# Patient Record
Sex: FEMALE | Race: WHITE | NOT HISPANIC OR LATINO | Employment: STUDENT | URBAN - METROPOLITAN AREA
[De-identification: names, ages, dates, MRNs, and addresses within clinical notes are randomized per-mention and may not be internally consistent; named-entity substitution may affect disease eponyms.]

---
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
## 2023-01-20 ENCOUNTER — OFFICE VISIT (OUTPATIENT)
Dept: FAMILY MEDICINE | Facility: CLINIC | Age: 18
End: 2023-01-20
Payer: COMMERCIAL

## 2023-01-20 VITALS
OXYGEN SATURATION: 98 % | WEIGHT: 231.81 LBS | DIASTOLIC BLOOD PRESSURE: 86 MMHG | TEMPERATURE: 98 F | HEIGHT: 64 IN | HEART RATE: 92 BPM | BODY MASS INDEX: 39.58 KG/M2 | SYSTOLIC BLOOD PRESSURE: 110 MMHG

## 2023-01-20 DIAGNOSIS — Z00.00 ENCOUNTER FOR MEDICAL EXAMINATION TO ESTABLISH CARE: Primary | ICD-10-CM

## 2023-01-20 DIAGNOSIS — F41.9 ANXIETY AND DEPRESSION: ICD-10-CM

## 2023-01-20 DIAGNOSIS — F32.A ANXIETY AND DEPRESSION: ICD-10-CM

## 2023-01-20 DIAGNOSIS — Z72.0 TOBACCO USE: ICD-10-CM

## 2023-01-20 DIAGNOSIS — Z00.00 ANNUAL PHYSICAL EXAM: ICD-10-CM

## 2023-01-20 DIAGNOSIS — E66.09 CLASS 2 OBESITY DUE TO EXCESS CALORIES WITHOUT SERIOUS COMORBIDITY WITH BODY MASS INDEX (BMI) OF 39.0 TO 39.9 IN ADULT: ICD-10-CM

## 2023-01-20 PROCEDURE — 3079F PR MOST RECENT DIASTOLIC BLOOD PRESSURE 80-89 MM HG: ICD-10-PCS | Mod: CPTII,S$GLB,, | Performed by: FAMILY MEDICINE

## 2023-01-20 PROCEDURE — 1160F PR REVIEW ALL MEDS BY PRESCRIBER/CLIN PHARMACIST DOCUMENTED: ICD-10-PCS | Mod: CPTII,S$GLB,, | Performed by: FAMILY MEDICINE

## 2023-01-20 PROCEDURE — 1160F RVW MEDS BY RX/DR IN RCRD: CPT | Mod: CPTII,S$GLB,, | Performed by: FAMILY MEDICINE

## 2023-01-20 PROCEDURE — 3074F PR MOST RECENT SYSTOLIC BLOOD PRESSURE < 130 MM HG: ICD-10-PCS | Mod: CPTII,S$GLB,, | Performed by: FAMILY MEDICINE

## 2023-01-20 PROCEDURE — 1159F PR MEDICATION LIST DOCUMENTED IN MEDICAL RECORD: ICD-10-PCS | Mod: CPTII,S$GLB,, | Performed by: FAMILY MEDICINE

## 2023-01-20 PROCEDURE — 3008F PR BODY MASS INDEX (BMI) DOCUMENTED: ICD-10-PCS | Mod: CPTII,S$GLB,, | Performed by: FAMILY MEDICINE

## 2023-01-20 PROCEDURE — 99999 PR PBB SHADOW E&M-EST. PATIENT-LVL IV: ICD-10-PCS | Mod: PBBFAC,,, | Performed by: FAMILY MEDICINE

## 2023-01-20 PROCEDURE — 3079F DIAST BP 80-89 MM HG: CPT | Mod: CPTII,S$GLB,, | Performed by: FAMILY MEDICINE

## 2023-01-20 PROCEDURE — 99999 PR PBB SHADOW E&M-EST. PATIENT-LVL IV: CPT | Mod: PBBFAC,,, | Performed by: FAMILY MEDICINE

## 2023-01-20 PROCEDURE — 3008F BODY MASS INDEX DOCD: CPT | Mod: CPTII,S$GLB,, | Performed by: FAMILY MEDICINE

## 2023-01-20 PROCEDURE — 99214 OFFICE O/P EST MOD 30 MIN: CPT | Mod: S$GLB,,, | Performed by: FAMILY MEDICINE

## 2023-01-20 PROCEDURE — 3074F SYST BP LT 130 MM HG: CPT | Mod: CPTII,S$GLB,, | Performed by: FAMILY MEDICINE

## 2023-01-20 PROCEDURE — 99214 PR OFFICE/OUTPT VISIT, EST, LEVL IV, 30-39 MIN: ICD-10-PCS | Mod: S$GLB,,, | Performed by: FAMILY MEDICINE

## 2023-01-20 PROCEDURE — 1159F MED LIST DOCD IN RCRD: CPT | Mod: CPTII,S$GLB,, | Performed by: FAMILY MEDICINE

## 2023-01-20 RX ORDER — MEDROXYPROGESTERONE ACETATE 150 MG/ML
150 INJECTION, SUSPENSION INTRAMUSCULAR
COMMUNITY
Start: 2022-12-02

## 2023-01-20 NOTE — PATIENT INSTRUCTIONS
The [facility] requires the patients the call and make their own appointments.  Please see the below phone numbers:    Adults - [phone]  Pediatrics - [phone]    [facility] and [facility] are also great resources for therapy

## 2023-01-20 NOTE — PROGRESS NOTES
Assessment & Plan:    Encounter for medical examination to establish care    Annual physical exam    Anxiety and depression  Patient was amade aware of the availability of counseling through the [facility] department, [email or web address], or [facility].    Class 2 obesity due to excess calories without serious comorbidity with body mass index (BMI) of 39.0 to 39.9 in adult  Encouraged moderate exercise 30 minutes a day, 5 days a week.    Tobacco use  Admits to vaping occasionally - encouraged complete smoking cessation.         Health maintenance reviewed:  -Declines STI screening and vaccines     Follow-up: Follow up in 1 year (on 1/20/2024).  ______________________________________________________________________    Chief Complaint  Chief Complaint   Patient presents with    Establish Care       HPI  Skylar Velasquez is a 17 y.o. female with medical diagnoses as listed in the medical history and problem list that presents to the office to establish care. She states that she has been dealing with depression and anxiety and would like to see a therapist. She is otherwise doing well.     Health Maintenance         Date Due Completion Date    Hepatitis C Screening Never done ---    Lipid Panel Never done ---    COVID-19 Vaccine (1) Never done ---    HIV Screening Never done ---    Chlamydia Screening Never done ---    Influenza Vaccine (1) 09/01/2022 11/17/2020    TETANUS VACCINE 04/22/2031 4/22/2021              PAST MEDICAL HISTORY:  History reviewed. No pertinent past medical history.    PAST SURGICAL HISTORY:  History reviewed. No pertinent surgical history.    SOCIAL HISTORY:  Social History     Socioeconomic History    Marital status: Single   Tobacco Use    Smoking status: Never    Smokeless tobacco: Never   Substance and Sexual Activity    Alcohol use: No    Drug use: No    Sexual activity: Never       FAMILY HISTORY:  Family History   Problem Relation Age of Onset    No Known Problems  "Mother     Diabetes Father     Bipolar disorder Sister     No Known Problems Sister        ALLERGIES AND MEDICATIONS: updated and reviewed.  Review of patient's allergies indicates:   Allergen Reactions    Augmentin [amoxicillin-pot clavulanate] Rash     Current Outpatient Medications   Medication Sig Dispense Refill    medroxyPROGESTERone (DEPO-PROVERA) 150 mg/mL injection Inject 150 mg into the muscle every 3 (three) months.       No current facility-administered medications for this visit.         ROS  Review of Systems   Constitutional:  Negative for activity change, fever and unexpected weight change.   HENT:  Negative for congestion and sore throat.    Eyes:  Negative for photophobia and visual disturbance.   Respiratory:  Negative for cough and shortness of breath.    Cardiovascular:  Negative for chest pain and leg swelling.   Gastrointestinal:  Negative for abdominal pain, constipation, diarrhea, nausea and vomiting.   Endocrine: Negative for polydipsia, polyphagia and polyuria.   Genitourinary:  Negative for dysuria and urgency.   Musculoskeletal:  Negative for arthralgias and gait problem.   Skin:  Negative for color change.   Neurological:  Negative for dizziness, weakness and headaches.   Psychiatric/Behavioral:  Positive for dysphoric mood. Negative for sleep disturbance. The patient is nervous/anxious.          Physical Exam  Vitals:    01/20/23 1104   BP: 110/86   BP Location: Right arm   Patient Position: Sitting   BP Method: Medium (Manual)   Pulse: 92   Temp: 98.1 °F (36.7 °C)   TempSrc: Oral   SpO2: 98%   Weight: 105.2 kg (231 lb 13 oz)   Height: 5' 4" (1.626 m)    Body mass index is 39.79 kg/m².  Weight: 105.2 kg (231 lb 13 oz)   Height: 5' 4" (162.6 cm)   Physical Exam  Constitutional:       General: She is not in acute distress.     Appearance: She is obese.   HENT:      Head: Normocephalic and atraumatic.   Neck:      Thyroid: No thyromegaly.   Cardiovascular:      Rate and Rhythm: Normal " rate and regular rhythm.      Pulses: Normal pulses.      Heart sounds: Normal heart sounds.   Pulmonary:      Effort: Pulmonary effort is normal. No respiratory distress.      Breath sounds: Normal breath sounds.   Musculoskeletal:      Cervical back: Neck supple.      Right lower leg: No edema.      Left lower leg: No edema.   Lymphadenopathy:      Cervical: No cervical adenopathy.   Skin:     General: Skin is warm and dry.      Findings: No rash.   Neurological:      General: No focal deficit present.      Mental Status: She is alert and oriented to person, place, and time.   Psychiatric:         Mood and Affect: Mood normal.         Behavior: Behavior normal.         Thought Content: Thought content normal.

## 2023-01-25 DIAGNOSIS — Z11.59 NEED FOR HEPATITIS C SCREENING TEST: ICD-10-CM

## 2023-07-26 ENCOUNTER — PATIENT OUTREACH (OUTPATIENT)
Dept: ADMINISTRATIVE | Facility: HOSPITAL | Age: 18
End: 2023-07-26

## 2023-07-26 RX ORDER — IBUPROFEN 600 MG/1
TABLET ORAL
COMMUNITY
Start: 2023-05-08

## 2023-07-26 NOTE — PROGRESS NOTES
[facility] 1 Comm. Gap Report PCP Visit Needed 07.18.23 - office visit completed on 01/20/2023.    Labs updated.

## 2024-12-26 ENCOUNTER — PATIENT MESSAGE (OUTPATIENT)
Dept: FAMILY MEDICINE | Facility: CLINIC | Age: 19
End: 2024-12-26

## 2024-12-26 ENCOUNTER — LAB VISIT (OUTPATIENT)
Dept: LAB | Facility: HOSPITAL | Age: 19
End: 2024-12-26
Payer: COMMERCIAL

## 2024-12-26 ENCOUNTER — OFFICE VISIT (OUTPATIENT)
Dept: FAMILY MEDICINE | Facility: CLINIC | Age: 19
End: 2024-12-26
Payer: COMMERCIAL

## 2024-12-26 VITALS
DIASTOLIC BLOOD PRESSURE: 82 MMHG | HEART RATE: 81 BPM | SYSTOLIC BLOOD PRESSURE: 112 MMHG | WEIGHT: 217.94 LBS | OXYGEN SATURATION: 95 % | HEIGHT: 64 IN | TEMPERATURE: 98 F | RESPIRATION RATE: 18 BRPM | BODY MASS INDEX: 37.21 KG/M2

## 2024-12-26 DIAGNOSIS — F41.9 ANXIETY AND DEPRESSION: ICD-10-CM

## 2024-12-26 DIAGNOSIS — Z87.891 HISTORY OF NICOTINE VAPING: ICD-10-CM

## 2024-12-26 DIAGNOSIS — Z86.59 HISTORY OF PANIC ATTACKS: ICD-10-CM

## 2024-12-26 DIAGNOSIS — F32.A ANXIETY AND DEPRESSION: ICD-10-CM

## 2024-12-26 DIAGNOSIS — F51.04 PSYCHOPHYSIOLOGICAL INSOMNIA: ICD-10-CM

## 2024-12-26 DIAGNOSIS — E66.09 CLASS 2 OBESITY DUE TO EXCESS CALORIES WITHOUT SERIOUS COMORBIDITY WITH BODY MASS INDEX (BMI) OF 37.0 TO 37.9 IN ADULT: ICD-10-CM

## 2024-12-26 DIAGNOSIS — Z00.00 ENCOUNTER FOR ANNUAL HEALTH EXAMINATION: ICD-10-CM

## 2024-12-26 DIAGNOSIS — Z87.09 HISTORY OF TONSILLITIS: ICD-10-CM

## 2024-12-26 DIAGNOSIS — E66.812 CLASS 2 OBESITY DUE TO EXCESS CALORIES WITHOUT SERIOUS COMORBIDITY WITH BODY MASS INDEX (BMI) OF 37.0 TO 37.9 IN ADULT: ICD-10-CM

## 2024-12-26 DIAGNOSIS — Z00.00 ENCOUNTER FOR ANNUAL HEALTH EXAMINATION: Primary | ICD-10-CM

## 2024-12-26 DIAGNOSIS — N92.1 MENORRHAGIA WITH IRREGULAR CYCLE: ICD-10-CM

## 2024-12-26 PROBLEM — N92.0 MENORRHAGIA: Status: ACTIVE | Noted: 2024-12-26

## 2024-12-26 LAB
ALBUMIN SERPL BCP-MCNC: 3.1 G/DL (ref 3.5–5.2)
ALP SERPL-CCNC: 66 U/L (ref 40–150)
ALT SERPL W/O P-5'-P-CCNC: 8 U/L (ref 10–44)
ANION GAP SERPL CALC-SCNC: 10 MMOL/L (ref 8–16)
AST SERPL-CCNC: 12 U/L (ref 10–40)
BASOPHILS # BLD AUTO: 0.03 K/UL (ref 0–0.2)
BASOPHILS NFR BLD: 0.4 % (ref 0–1.9)
BILIRUB SERPL-MCNC: 0.4 MG/DL (ref 0.1–1)
BUN SERPL-MCNC: 8 MG/DL (ref 6–20)
CALCIUM SERPL-MCNC: 8.8 MG/DL (ref 8.7–10.5)
CHLORIDE SERPL-SCNC: 106 MMOL/L (ref 95–110)
CHOLEST SERPL-MCNC: 162 MG/DL (ref 120–199)
CHOLEST/HDLC SERPL: 3.7 {RATIO} (ref 2–5)
CO2 SERPL-SCNC: 25 MMOL/L (ref 23–29)
CREAT SERPL-MCNC: 0.7 MG/DL (ref 0.5–1.4)
DIFFERENTIAL METHOD BLD: ABNORMAL
EOSINOPHIL # BLD AUTO: 0.2 K/UL (ref 0–0.5)
EOSINOPHIL NFR BLD: 2.8 % (ref 0–8)
ERYTHROCYTE [DISTWIDTH] IN BLOOD BY AUTOMATED COUNT: 13.2 % (ref 11.5–14.5)
EST. GFR  (NO RACE VARIABLE): >60 ML/MIN/1.73 M^2
ESTIMATED AVG GLUCOSE: 100 MG/DL (ref 68–131)
GLUCOSE SERPL-MCNC: 89 MG/DL (ref 70–110)
HBA1C MFR BLD: 5.1 % (ref 4–5.6)
HCT VFR BLD AUTO: 44.8 % (ref 37–48.5)
HDLC SERPL-MCNC: 44 MG/DL (ref 40–75)
HDLC SERPL: 27.2 % (ref 20–50)
HGB BLD-MCNC: 14.3 G/DL (ref 12–16)
IMM GRANULOCYTES # BLD AUTO: 0.03 K/UL (ref 0–0.04)
IMM GRANULOCYTES NFR BLD AUTO: 0.4 % (ref 0–0.5)
LDLC SERPL CALC-MCNC: 100.2 MG/DL (ref 63–159)
LYMPHOCYTES # BLD AUTO: 2.3 K/UL (ref 1–4.8)
LYMPHOCYTES NFR BLD: 33.9 % (ref 18–48)
MCH RBC QN AUTO: 28.7 PG (ref 27–31)
MCHC RBC AUTO-ENTMCNC: 31.9 G/DL (ref 32–36)
MCV RBC AUTO: 90 FL (ref 82–98)
MONOCYTES # BLD AUTO: 0.5 K/UL (ref 0.3–1)
MONOCYTES NFR BLD: 6.8 % (ref 4–15)
NEUTROPHILS # BLD AUTO: 3.7 K/UL (ref 1.8–7.7)
NEUTROPHILS NFR BLD: 55.7 % (ref 38–73)
NONHDLC SERPL-MCNC: 118 MG/DL
NRBC BLD-RTO: 0 /100 WBC
PLATELET # BLD AUTO: 352 K/UL (ref 150–450)
PMV BLD AUTO: 11.3 FL (ref 9.2–12.9)
POTASSIUM SERPL-SCNC: 4.3 MMOL/L (ref 3.5–5.1)
PROT SERPL-MCNC: 6.6 G/DL (ref 6–8.4)
RBC # BLD AUTO: 4.99 M/UL (ref 4–5.4)
SODIUM SERPL-SCNC: 141 MMOL/L (ref 136–145)
TRIGL SERPL-MCNC: 89 MG/DL (ref 30–150)
WBC # BLD AUTO: 6.73 K/UL (ref 3.9–12.7)

## 2024-12-26 PROCEDURE — 80053 COMPREHEN METABOLIC PANEL: CPT

## 2024-12-26 PROCEDURE — 83036 HEMOGLOBIN GLYCOSYLATED A1C: CPT

## 2024-12-26 PROCEDURE — 99999 PR PBB SHADOW E&M-EST. PATIENT-LVL V: CPT | Mod: PBBFAC,,,

## 2024-12-26 PROCEDURE — 36415 COLL VENOUS BLD VENIPUNCTURE: CPT | Mod: PO

## 2024-12-26 PROCEDURE — 80061 LIPID PANEL: CPT

## 2024-12-26 PROCEDURE — 85025 COMPLETE CBC W/AUTO DIFF WBC: CPT

## 2024-12-26 NOTE — PROGRESS NOTES
" Assessment & Plan      Encounter for annual health examination     Last seen by PCP Dr. Zhou 1/2023 to establish care.  Will perform annual today and schedule 6 month f/u with Dr. Zhou  Does f/u with OBGYN - sees Dr. Styles at [facility], last 5/2024, f/u scheduled 5/2025  Encouraged to keep this appointment and f/u regularly       Relevant Orders    Comprehensive Metabolic Panel     CBC Auto Differential     Lipid Panel        Anxiety and depression - Primary  History of panic attacks     -- Sisters both have bipolar disorder - she states that parents feel she is a "carbon copy" of this condition, and advised her to seek help.  Denies any manic or hypomanic symptoms, instead stating that she loses her patience more easily than she'd like  -- Hx of panic attacks, but none in the past few months.  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    -- Has 1 child (3.5 y.o. boy), whose father is "not in the picture much.  Her mother helps extensively with his care.  -- States that her primary motivation in seeking care is to gain increased patience for her 3.5 year old.     -- Denies any SI, HI  -- PHQ-9 (performed in clinic) score of 13 correlating with moderate severity depression  PLAN:  -- Discussed 4x4 box breathing and palming, which she has never tried before  -- F/u with Dr. Zhou, PCP, in 6 months  -- Referral placed to outpatient psychiatry (BRADEN Pinedo)       Relevant Orders    Ambulatory referral/consult to Psychiatry      Psychophysiological insomnia     -- Bedtime routine consists of hanging out with her son in bed, including letting him use the iPad for 20-30 minutes and then playing music for both of them, which helps him fall asleep.  He does not wake up a lot or at all.  -- Discussed sleep hygiene at length, and reiterated instructions in AVS.  -- Melatonin gives her nightmares and wakes her up around 1 AM. "   -- Has found relief with Unisom in the past - advised her to continue as needed.  Will defer additional sleep aids to psychiatrist f/u.       Relevant Orders    Ambulatory referral/consult to Psychiatry       Class 2 obesity due to excess calories without serious comorbidity in adult     Briefly discussed calorie balance and its impact on weight management.  Additional instructions provided in AVS.        Relevant Orders    Comprehensive Metabolic Panel     Hemoglobin A1C     CBC Auto Differential     Lipid Panel      History of nicotine vaping     No longer vaping - commended and encouraged.          History of tonsillitis     No recurrence since antibiotic administration 4/2024  No further intervention indicated at this time.         Menorrhagia     Heavy flow for 2 days, bleeds for 3 total days.  When she uses pads, has to change every 2-3 hours.  Will check CBC today.  Follows with OBGYN regularly, next appointment 5/2025.  Relevant Orders   Comprehensive Metabolic Panel               HPI   Skylar Velasquez is a 19 y.o. female with multiple medical diagnoses as listed in the medical history and problem list who presents for an annual physical exam.     Chart Review:  -- Last seen by PCP Dr. Zhou 1/2023 to establish care.    -- Does f/u with OBGYN - [facility]. Styles [facility], last 5/2024, f/u scheduled 5/2025    Today, patient reports she is interested in understanding and managing decreased patient's with her 3-year-old son, as well as chronic anxiety, depression &  insomnia    ROS:  Patient denies any CP, SOB, abdominal pain, fever, chills, dizziness, rash, unintentional weight loss,   hematuria, hematochezia  ______________________________________________________________________    Preventative Health Screening      Tobacco use: None     Alcohol use: 0-2 drinks per week    Substance use: none    Estimated body mass index is Body mass index is 37.41 kg/m². kg/m²     Exercise: Discussed as  "above  Counseled patient on the many benefits of regular exercise, including improved cardio-pulmonary health, stronger bones and muscles, enhanced mood, increased energy levels and improved sleep quality, as well as reduced risk of heart disease, stroke, type 2 diabetes, certain cancers, osteoporosis, falls in older adults, and premature death.      Sleep  Discussed as above  The problem of sleep insufficiency was discussed at length. Chronic sleep insufficiency increases risk of cardiovascular disease, metabolic disease, impaired immune function, mental health disorders, accidents and overall mortality.  Avoidance of caffeine sources is strongly encouraged, especially after noon. Exercise as above. Sleep hygiene was reviewed in detail.   She does snore but denies any nocturia or apneic events that she is aware of.  Will hold-off on PSG today.  If sleep hygiene modification and Unisom are ineffective, will reconsider.     Depression  PHQ-9 as above    Intimate partner violence screening  "Do you feel safe in your current relationship?" N/a  "Have you ever been in a relationship in which your partner frightened you or hurt you?" No    Reproductive Health  Last menstrual period began yesterday and was heavy - will check CBC today.   Patient is not currently sexually active.   Contraception: condoms  On Daily folic acid:  reviewed - advised prenatal vitamin regularly   STD screening in last year: - via OBGYN  Last PAP: via OBGYN in 5/2024  HIV screening: reviewed     Chronic Disease Screening    The ASCVD Risk score (Flory SWAIN, et al., 2019) failed to calculate for the following reasons:    The 2019 ASCVD risk score is only valid for ages 40 to 79  CHD Risk Factors: obesity (BMI >= 30 kg/m2)    Dyslipidemia screening needed: reviewed   T2DM screening needed: reviewed   Mammogram needed: reviewed   Colonoscopy needed: reviewed   Osteoporosis screen needed: reviewed     HLD  Women 45 years and older should be screened " for dyslipidemia if at increased risk of CHD  Women 20 to 45 years of age should be screened for dyslipidemia if at increased risk of CHD  T2DM  Asymptomatic adults with sustained blood pressure greater than 135/80 mm Hg (treated or untreated) should be screened for type 2 diabetes mellitus  BRCA  Women 50 to 74 years of age should be screened for breast cancer with mammography biennially.  CRCA  Adults 50 to 75 years of age should be screened for colorectal cancer with an FOBT annually, sigmoidoscopy every five years with an FOBT every three years, or colonoscopy every 10 years.  OSTEOPOROSIS  Women 65 years and older should be screened for osteoporosis. Women younger than 65 years should be screened if the risk of fracture is greater than or equal to that of a 65-year-old white woman without additional risk factors.    Living Will/POA: No - will discuss at f/u visit    Health maintenance reviewed UTD on vaccinations  Health Maintenance         Date Due Completion Date    Chlamydia Screening Never done ---    Pap Smear Never done ---    COVID-19 Vaccine (1 - 2024-25 season) 12/26/2025 (Originally 9/1/2024) ---    TETANUS VACCINE 04/22/2031 4/22/2021    RSV Vaccine (Age 60+ and Pregnant patients) (1 - 1-dose 75+ series) 10/01/2078 ---            Follow Up: With Dr. Zhou in 6 months     Physical Exam   Vital signs reviewed.   Body mass index is 37.41 kg/m².   General:  Well-developed, well-nourished. NAD. Obese  Skin:  Warm, dry. No rashes or lesions noted. No palmar erythema. Cap refill <2s bilaterally  Head:  NC/AT   Eyes:  Conjunctivae w/o exudates or hemorrhage.  Non-icteric sclerae.  Ears:  External ears w/o swelling or erythema.  Nose:  Nares are patent bilaterally w/o rhinorrhea or epistaxis  Mouth:  Mucosa is pink and moist.  No nodules or lesions noted.  No tonsillar swelling or exudates.  Neck:  Supple w/o adenopathy or nuchal rigidity.  Trachea is midline.   Heart:  Normal S1 & S2.  No extra heart sounds.   No pulsus alternans.  Lungs:  CTAB without rales, rhonchi or wheezing.  Breathing comfortably on RA.  Abdomen:  Symmetric, non-distended, non-tender  Extremities:  UE & LE are atraumatic without swelling, erythema, tenderness or deformity.  Pulses palpable in all extremities.  + evidence of nailbiting in bilateral hands  Neuro:  A&O4.  No obvious focal deficits. Negative Howell's sign.  Strength 5/5 in all extremities   Psychiatric:  Appropriate affect.      History     Past Medical History:  History reviewed. No pertinent past medical history.    Past Surgical History:  History reviewed. No pertinent surgical history.    Social History:  Social History     Socioeconomic History    Marital status: Single    Number of children: 1   Tobacco Use    Smoking status: Never     Passive exposure: Never    Smokeless tobacco: Never   Substance and Sexual Activity    Alcohol use: No    Drug use: No    Sexual activity: Yes     Partners: Male     Birth control/protection: Condom       Family History:  Family History   Problem Relation Name Age of Onset    No Known Problems Mother      Diabetes Father      Bipolar disorder Sister      No Known Problems Sister         Allergies and Medications: (updated and reviewed)  Review of patient's allergies indicates:   Allergen Reactions    Augmentin [amoxicillin-pot clavulanate] Rash     Current Outpatient Medications   Medication Sig Dispense Refill     mg tablet TAKE 1 TABLET (600MG) BY MOUTH EVERY 6 HOURS WITH FOOD AS NEEDED FOR PAIN (Patient not taking: Reported on 12/26/2024)      medroxyPROGESTERone (DEPO-PROVERA) 150 mg/mL injection Inject 150 mg into the muscle every 3 (three) months. (Patient not taking: Reported on 12/26/2024)       No current facility-administered medications for this visit.       Patient Care Team:  Opal Zhou DO as PCP - General (Family Medicine)  Brooke Stevenson LPN as Care Coordinator      Coy Tamez PA-C  Family Medicine  [facility]        - The patient is given an After Visit Summary that lists all medications with directions, allergies, education, orders placed during this encounter and follow-up instructions.      - I have reviewed the patient's medical information including past medical, family, and social history sections including the medications and allergies.      - We discussed the patient's current medications.     This note was created by combination of typed  and MModal dictation.  Transcription errors may be present.  If there are any questions, please contact me.

## 2024-12-26 NOTE — ASSESSMENT & PLAN NOTE
"-- Sisters both have bipolar disorder - she states that parents feel she is a "carbon copy" of this condition, and advised her to seek help.  Denies any manic or hypomanic symptoms, instead stating that she loses her patience more easily than she'd like  -- Hx of panic attacks, but none in the past few months.  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    -- Has 1 child (3.5 y.o. boy), whose father is "not in the picture much.  Her mother helps extensively with his care.  -- States that her primary motivation in seeking care is to gain increased patience for her 3.5 year old.     -- Denies any SI, HI  -- PHQ-9 (performed in clinic) score of 13 correlating with moderate severity depression  PLAN:  -- Discussed 4x4 box breathing and palming, which she has never tried before  -- F/u with Dr. Zhou, PCP, in 6 months  -- Referral placed to outpatient psychiatry (BRADEN Pinedo)  "

## 2024-12-26 NOTE — ASSESSMENT & PLAN NOTE
-- Bedtime routine consists of hanging out with her son in bed, including letting him use the iPad for 20-30 minutes and then playing music for both of them, which helps him fall asleep.  He does not wake up a lot or at all.  -- Discussed sleep hygiene at length, and reiterated instructions in AVS.  -- Melatonin gives her nightmares and wakes her up around 1 AM.   -- Has found relief with Unisom in the past - advised her to continue as needed.  Will defer additional sleep aids to psychiatrist f/u.

## 2024-12-26 NOTE — PATIENT INSTRUCTIONS
Thank you for seeing me today!      Pt Education: Sleep Hygiene  Discussed Unisom, which is fine to take as needed.  Also discussed consistent sleep/wake time at length, as well as limiting light exposure and opting for a book instead after son falls asleep.      1. Avoid watching TV, eating, and discussing emotional issues in bed. The bed should be used for sleep and sex only. If not, we can associate the bed with other activities and it often becomes difficult to fall asleep.  2. Minimize noise, light, and temperature extremes during sleep with ear plugs, window blinds, or an electric blanket or air conditioner. Even the slightest nighttime noises or luminescent lights can disrupt the quality of your sleep. Try to keep your bedroom at a comfortable temperature -- not too hot (above 75 degrees) or too cold (below 54 degrees).  3. Try not to drink fluids after 8 p.m. This may reduce awakenings due to urination.  4. Avoid naps, but if you do nap, make it no more than about 25 minutes about eight hours after you awake. But if you have problems falling asleep, then no naps for you.  5. Do not expose your self to bright light if you need to get up at night. Use a small night-light instead.  6. Nicotine is a stimulant and should be avoided particularly near bedtime and upon night awakenings. Having a smoke before bed, although it may feel relaxing, is actually putting a stimulant into your bloodstream.  7. Caffeine is also a stimulant and is present in coffee (100-200 mg), soda (50-75 mg), tea (50-75 mg), and various over-the-counter medications. Caffeine should be discontinued at least four to six hours before bedtime. If you consume large amounts of caffeine and you cut your self off too quickly, beware; you may get headaches that could keep you awake.  8. Although alcohol is a depressant and may help you fall asleep, the subsequent metabolism that clears it from your body when you are sleeping causes a withdrawal  syndrome. This withdrawal causes awakenings and is often associated with nightmares and sweats.  9. A light snack may be sleep-inducing, but a heavy meal too close to bedtime interferes with sleep. Stay away from protein and stick to carbohydrates or dairy products. Milk contains the amino acid L-tryptophan, which has been shown in research to help people go to sleep. So milk and cookies or crackers (without chocolate) may be useful and taste good as well.  10. Ensure sunlight and/or bright light exposure early in the morning after waking at a consistent wake time.       Weight Loss  Calorie Restriction:  -- Calorie restriction is effective for weight loss, and has added benefits for lower blood pressure, lowering cholesterol, improving glycemic control, increasing mobility and reducing strain on joints.   -- Aim for a daily calorie deficit of 500 calories to lose about one pound per week. For example, if the average adult requires 2,000 calories per day, reducing intake to 1,500 calories per day creates this deficit.    -- Ensure daily intake does not drop below 1,200 calories for women or 1,500 calories for men to avoid nutrient deficiencies.  Protein Intake:  -- To protect lean muscle mass, aim for about 0.7 grams of protein per pound (1.5g/kg) of body weight. Above 100g of protein per day can help preserve lean muscle mass during calorie restriction      Carbohydrate Management:  -- Choose higher-fiber, lower-carbohydrate meals to control hunger and appetite. Opt for carbs high in fiber and low in added sugar, such as beans and sweet potatoes, and avoid sugary drinks and chips       Fiber Intake:  -- Increasing fiber intake can increase satiety, lower cholesterol, and improve glucose homeostasis.  It is also somewhat protective against colorectal cancer.  -- Increase fiber intake by adding sources like psyllium husk or Metamucil before meals to promote fullness and reduce portion size.    -- Focus on regular  intake of vegetables, which also helps in managing blood sugar levels    Physical Activity:  -- Incorporate physical activity to enhance insulin sensitivity and slightly assist with achieving a calorie deficit.  Aim for strength-training activities at least two days per week, with 2-3 days of moderate to vigorous cardiovascular training (brisk walking, cycling, swimming).  Sodium, NSAIDs and Hypertension:  -- Limit sodium addition to cooked or prepared foods. Avoid regular use of NSAIDs, as these medications can increase blood pressure    In Summary:  Focus on regular intake of vegetables, lean protein (i.e. chicken breast, turkey, fish), and healthy fats (i.e olive oil, avocados, nuts) to minimize hunger and avoid nutrient deficiency while restricting calories. Additionally, strive to limit sodium intake, increase fiber intake and incorporate regular physical activity to help preserve muscle mass as you lose weight.

## 2024-12-26 NOTE — ASSESSMENT & PLAN NOTE
No recurrence since antibiotic administration 4/2024  No further intervention indicated at this time.

## 2024-12-26 NOTE — ASSESSMENT & PLAN NOTE
Heavy flow for 2 days, bleeds for 3 total days.  When she uses pads, has to change every 2-3 hours.  Will check CBC today.  Follows with OBGYN regularly, next appointment 5/2025.

## 2024-12-26 NOTE — ASSESSMENT & PLAN NOTE
Briefly discussed calorie balance and its impact on weight management.  Additional instructions provided in AVS.

## 2024-12-27 ENCOUNTER — TELEPHONE (OUTPATIENT)
Dept: PSYCHIATRY | Facility: CLINIC | Age: 19
End: 2024-12-27
Payer: COMMERCIAL

## 2024-12-27 NOTE — TELEPHONE ENCOUNTER
MA called/LVM for patient to call the office at her convenience in regards to scheduling an appointment from her referral.

## 2024-12-27 NOTE — TELEPHONE ENCOUNTER
----- Message from Lester Pinedo PA-C sent at 12/27/2024 10:14 AM CST -----  Regarding: RE: [facility] Psychiatry Referral Order  Yes , Thank you  ----- Message -----  From: Aparna Etienne MA  Sent: 12/27/2024   9:43 AM CST  To: Lester Pinedo PA-C  Subject: FW: [facility] Psychiatry Referral Order               Please review/advise, thank you.    Is it ok to schedule this patient an appointment with you?  ----- Message -----  From: Lanie Low  Sent: 12/26/2024  10:49 AM CST  To: [facility] Psychiatry Clinical Support  Subject: [facility] Psychiatry Referral Order                   Good morning! PT has a referral in the system dated 12/26/24 for Anxiety, Depression and Insomnia. Pt is being referred to Lester Pinedo at [facility] psychiatry clinic for diagnostic evaluation/testing.        Order Specific Questions  What is the reason for the visit?  Evaluation/Testing          What is the reason for visit?  Diagnostic Testing. sister with bipolar, wants to be evaluated for the same            Jacquie,  Estefania

## 2025-01-22 ENCOUNTER — TELEPHONE (OUTPATIENT)
Dept: PSYCHIATRY | Facility: CLINIC | Age: 20
End: 2025-01-22
Payer: COMMERCIAL

## 2025-01-23 ENCOUNTER — OFFICE VISIT (OUTPATIENT)
Dept: PSYCHIATRY | Facility: CLINIC | Age: 20
End: 2025-01-23
Payer: COMMERCIAL

## 2025-01-23 DIAGNOSIS — F33.1 MDD (MAJOR DEPRESSIVE DISORDER), RECURRENT EPISODE, MODERATE: ICD-10-CM

## 2025-01-23 DIAGNOSIS — F41.1 GAD (GENERALIZED ANXIETY DISORDER): Primary | ICD-10-CM

## 2025-01-23 PROCEDURE — 1159F MED LIST DOCD IN RCRD: CPT | Mod: CPTII,95,,

## 2025-01-23 PROCEDURE — 90792 PSYCH DIAG EVAL W/MED SRVCS: CPT | Mod: 95,,,

## 2025-01-23 PROCEDURE — 1160F RVW MEDS BY RX/DR IN RCRD: CPT | Mod: CPTII,95,,

## 2025-01-23 RX ORDER — SERTRALINE HYDROCHLORIDE 25 MG/1
25 TABLET, FILM COATED ORAL DAILY
Qty: 30 TABLET | Refills: 1 | Status: SHIPPED | OUTPATIENT
Start: 2025-01-23 | End: 2025-03-24

## 2025-01-23 RX ORDER — TRAZODONE HYDROCHLORIDE 50 MG/1
25-50 TABLET ORAL NIGHTLY
Qty: 30 TABLET | Refills: 1 | Status: SHIPPED | OUTPATIENT
Start: 2025-01-23 | End: 2025-03-24

## 2025-01-23 NOTE — PROGRESS NOTES
"Outpatient Psychiatry Initial Visit   1/23/2025    The patient location is: [place]    Visit type: audiovisual    Face to Face time with patient:   70 minutes of total time spent on the encounter, which includes face to face time and non-face to face time preparing to see the patient (eg, review of tests), Obtaining and/or reviewing separately obtained history, Documenting clinical information in the electronic or other health record, Independently interpreting results (not separately reported) and communicating results to the patient/family/caregiver, or Care coordination (not separately reported).     Each patient to whom he or she provides medical services by telemedicine is:  (1) informed of the relationship between the physician and patient and the respective role of any other health care provider with respect to management of the patient; and (2) notified that he or she may decline to receive medical services by telemedicine and may withdraw from such care at any time.      Skylar Velasquez, a 19 y.o. female, presenting for initial evaluation visit. Met with patient.    Reason for Encounter: Referral from Coy Tamez PA-C . Patient complains of anxiety and depression    History of Present Illness:    SUBJECTIVE:   Psych Interview 01/23/2025:   Skylar Velasquez is a 19 y.o. female with past psychiatric history of anxiety and depression  presented to for initial evaluation and treatment for mood.    Pt is A+Ox 4.  Patients mood is "I've been better", affect appears guarded, anxious. Pts thought process is normal and logical.  Pts speech is normal tone, normal rate, normal pitch, normal volume   Linear and logical, friendly and cooperative, normal eye contact, no psychomotor retardation.  Pt is calmly seated in chair during interview.  Pt is casually dressed and well groomed.       Patient was never previously being seen by Psychiatry.  Pt states that they have never taken anything for mood. "  Patient endorses acute on chronic anxiety and depression which began in middle school and has progressively worsened recently. Patient had her son at 15 years old, states that they have bonded well but she has noticed an increase in irritability over the past one year. Patient has a good support system consisting of her parents whom she lives with. Patient states that her child's father is not in the picture. Patient states that his family has recently become more involved. Patient states that her biggest stressor currently is juggling work and home life. Patient has three jobs , [employer] coordinator, and real estate stagger. 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 Patient states that she has been having a difficult time initiating sleep, has tried melatonin which gave her nightmares. Was taking unisom while she was pregnant which helped some, interested in trying alternative medications for insomnia.    Pt states that her sister was Dx with bipolar at 16y/o. Pt denies hx symptoms/episodes of sonia.    Denies prior hx of psychiatric hospitalizations. Denies hx of suicide attempts. Pt denies hx self harm. Pt denies hx hallucinations.  Pt endorses binging and purging in middle school.    Pt denies hx trauma. Denies physical/sexual abuse. Pt denies symptoms including nightmares, hypervigilance, flashbacks, avoidance behaviors, and disassociation.    Reports depression today as 4/10, and anxiety as 7/10.  Reports sleeping 5 hrs per night, and poor appetite.   Denies SI/HI/AVH.  Pt states that there support consists of parents whom she lives with.    Access to Gun denies  Denies recreational drug use.  Pt reports denies drinks per week, denies tobacco use, denies Vaping, denies Caffeine.      Current Medication:  none    Past Medications:  none    DX:  The patient complained of depressed mood with lethargy, decreased appetite , insomnia, psycho-motor  retardation, anhedonia, apathy, worsening self-esteem, guilt, decreased concentration and ability to make decisions.     Pt denies hx symptoms/episodes of sonia.    Admits to symptoms of anxiety including excessive anxiety/worry/fear, more days than not, about numerous issues, difficulty controlling the worry, over thinking, rumination, restlessness, poor concentration, fatigue, and increased irritability. Denies panic attacks at this time.     Standardized Screenings tools:   PHQ9: 13  DARREN- 7: 13    Review Of Systems:     Review of Systems   Constitutional:  Negative for weight loss.   HENT:  Negative for tinnitus.    Eyes:  Negative for blurred vision.   Respiratory:  Negative for cough and shortness of breath.    Cardiovascular:  Negative for chest pain.   Gastrointestinal:  Negative for abdominal pain.   Genitourinary:  Negative for dysuria.   Musculoskeletal:  Negative for back pain and neck pain.   Skin:  Negative for rash.   Neurological:  Negative for dizziness, seizures and weakness.   Psychiatric/Behavioral:  Positive for depression. Negative for hallucinations, memory loss, substance abuse and suicidal ideas. The patient is nervous/anxious and has insomnia.        Psychiatric Review Of Systems - Is patient experiencing or having changes in:  sleep: yes  appetite: yes  weight: no  energy/anergy: yes  interest/pleasure/anhedonia: yes  somatic symptoms: no  libido: no  anxiety/panic: yes  guilty/hopelessness: no  concentration: no  S.I.B.s/risky behavior: no  Irritability: yes  Racing thoughts: no  Impulsive behaviors: no  Paranoia: no  AVH: no    Risk Parameters:  Patient reports no suicidal ideation  Patient reports no homicidal ideation  Patient reports no self-injurious behavior  Patient reports no violent behavior    OBJECTIVE     Past Psychiatric History:   Previous Psychiatric Hospitalizations: NO  Previous Medication Trials: NO  History of psychotherapy:  NO  Previous Suicide Attempts: NO  History of  "Violence:  NO  History of physical/sexual abuse: NO  Outpatient psychiatric provider(past): NO    Substance Abuse History:   Tobacco: NO  Alcohol: NO  Illicit Substances: NO  Detox/Rehab: NO    Neurological History:   Seizures: NO  Head trauma: NO    Family Psychiatric History: Yes -   Sister - bipolar   Father - anxiety    Social History:  Developmental/Childhood:Achieved all developmental milestones timely  *Education:High School Diploma  Employment Status/Finances:Employed   Relationship Status/Sexual Orientation: Single:    Children: 1  Housing Status: Home    history:  NO  Access to gun: NO  Latter-day:Non-practicing  Recreational activities:Time with family  Person patient is closest to/confides in: parents    Legal History:   Past Charges/Incarcerations:  No      Past Medical/Surgical History:   No past medical history on file.  No past surgical history on file.      Current Medications:   Medication List with Changes/Refills   New Medications    SERTRALINE (ZOLOFT) 25 MG TABLET    Take 1 tablet (25 mg total) by mouth once daily.    TRAZODONE (DESYREL) 50 MG TABLET    Take 0.5-1 tablets (25-50 mg total) by mouth every evening.   Current Medications     MG TABLET    TAKE 1 TABLET (600MG) BY MOUTH EVERY 6 HOURS WITH FOOD AS NEEDED FOR PAIN    MEDROXYPROGESTERONE (DEPO-PROVERA) 150 MG/ML INJECTION    Inject 150 mg into the muscle every 3 (three) months.       Allergies:   Review of patient's allergies indicates:   Allergen Reactions    Augmentin [amoxicillin-pot clavulanate] Rash         Vitals   There were no vitals filed for this visit.     Labs/Imaging/Studies:   No results found for this or any previous visit (from the past 48 hours).   No results found for: "PHENYTOIN", "PHENOBARB", "VALPROATE", "CBMZ"      Nutritional Screening: Considering the patient's height and weight, medications, medical history and preferences, should a referral be made to the dietitian? no    Constitutional  Vitals:  " "Most recent vital signs, dated less than 90 days prior to this appointment, were reviewed.    There were no vitals filed for this visit.     General:  unremarkable, age appropriate     Musculoskeletal  Muscle Strength/Tone:  no spasicity, no rigidity, no cogwheeling, no flaccidity, no paratonia, no dyskinesia, no dystonia, no tremor, no tic, no choreoathetosis, no atrophy   Gait & Station:  non-ataxic       Psychiatric Mental Status Exam:  Arousal: alert  Sensorium/Orientation: oriented to grossly intact, person, place, situation, time/date  Behavior/Cooperation: normal, cooperative   Speech: normal tone, normal rate, normal pitch, normal volume  Language: grossly intact, able to name, able to repeat  Mood: anxious, depressed  Affect: congruent and appropriate  Thought Process: normal and logical  Thought Content: concerned with meds  Auditory hallucinations: NO  Visual hallucinations: NO  Paranoia: NO  Delusions:  NO  Suicidal ideation: NO  Homicidal ideation: NO  Attention/Concentration:  spelled "WORLD" forwards and backwards  Memory:    Recent: [facility] Recent Memory: WNL , 3 out of 3 in 3 minutes  Remote: [facility] Remote Memory: WNL , past events, as relates history  Fund of Knowledge: Aware of current events, Intact, and Vocabulary appropriate    Intelligence: [facility] Intelligence: Average, based on history, based on vocabulary, syntax, grammar and content  Insight: {[facility] insight: Fair, understanding severity of illness/history of present illness  Judgment: [facility] Judgement: Fair, per patient's behavior/history of present illness      Relevant Elements of Neurological Exam: normal gait        Assessment / Plan:     Diagnosis:      ICD-10-CM ICD-9-CM   1. DARREN (generalized anxiety disorder)  F41.1 300.02   2. MDD (major depressive disorder), recurrent episode, moderate  F33.1 296.32       Strengths and Liabilities: Strength: Patient accepts guidance/feedback, Strength: Patient is expressive/articulate., Strength: Patient is " motivated for change., Liability: Patient lacks coping skills.    Treatment Goals:  Specify outcomes written in observable, behavioral terms:   Anxiety: acquiring relapse prevention skills, reducing negative automatic thoughts, reducing physical symptoms of anxiety, and reducing time spent worrying (<30 minutes/day)  Depression: increasing motivation, increasing self-reward for positive thoughts (one/day), increasing social contacts (three/week), and reducing fatigue    Treatment Plan/Recommendations:     Mood   Start Zoloft 25mg daily - targeting anxiety and depression   Start Trazodone 25-50mg QHS - targeting insomnia    Pt would benefit from Talk therapy, States that she is not interested at this time    Labs reviewed: Kidney and Liver function look - OK. No concern at this time    Discussed diagnosis, risks and benefits of proposed treatment above vs alternative treatments vs no treatment, and potential side effects of these treatments, and the inherent unpredictability of individual response to treatment.  The patient expresses understanding and gives informed consent to pursue treatment.  The potential benefits outweigh the potential risks. Patient has no other questions. Risks/adverse effects discussed at this time including but not limited to: GI side effects, sexual dysfunction, activation vs sedation, triggering of suicidal thoughts, and serotonin syndrome.   Educated about negative aspects of psychiatric medications during pregnancy.  Should Pt decide to or become pregnant instructed them to contact me immediatly. Pt instructed to take all precautions to prevent pregnancy while on these medications.     Serotonin syndrome   Mental status changes can include anxiety, restlessness, disorientation, and agitated delirium.    Autonomic manifestations can include diaphoresis, tachycardia, hyperthermia, hypertension, vomiting, and diarrhea   Neuromuscular hyperactivity can manifest as tremor, myoclonus,  hyperreflexia, rigidity, hyperthermia, seizure, and bilateral Babinski sign.   Pt was informed that if they experience any of these symptoms to go the ED.       Difficulty Sleeping Behavioral Modification:  Implement stimulus control: [place] bedroom for sleep only. Leave bedroom when frustrated from not sleeping. Engage in relaxation before returning. Engage in activities during the day. AVOID >7-8 h time in bed  Avoid clock watching  Avoid thinking/worrying about sleep when trying to fall asleep  Limit caffeinated consumption  Make sure the bedroom is dark, quiet and cool    Safety Plan   Patient voices understanding and agreement with this plan  Provided crisis numbers  Encouraged patient to keep future appointments.  Instructed patient to call or message with questions or concerns  In the event of an emergency, including suicidal ideation, patient was advised to go to the emergency room and/or call 911    Return to Clinic: 1 month    Psychotherapy:  Target symptoms: depression, anxiety   Why chosen therapy is appropriate versus another modality: relevant to diagnosis, evidence based practice  Outcome monitoring methods: self-report, observation  Therapeutic intervention type: insight oriented psychotherapy, interactive psychotherapy  Topics discussed/themes: relationships difficulties, difficulty managing affect in interpersonal relationships, building skills sets for symptom management  The patient's response to the intervention is guarded. The patient's progress toward treatment goals is fair.   Duration of intervention: 15 minutes.    Total face to face time: 60 min  Total time (chart review, patient contact, documentation): 70 min    A diagnostic psychiatric evaluation was performed and responsiveness to treatment was assessed.  The patient demonstrates adequate ability/capacity to respond to treatment.    Lester Pinedo PA-C      *This note has been prepared using a combination of a dictation device and  typing.  It has been checked for errors but some errors may still exist within the note as a result of speech recognition errors and/or typographical errors.

## 2025-02-20 ENCOUNTER — OFFICE VISIT (OUTPATIENT)
Dept: PSYCHIATRY | Facility: CLINIC | Age: 20
End: 2025-02-20
Payer: COMMERCIAL

## 2025-02-20 DIAGNOSIS — F33.1 MDD (MAJOR DEPRESSIVE DISORDER), RECURRENT EPISODE, MODERATE: Primary | ICD-10-CM

## 2025-02-20 DIAGNOSIS — F41.1 GAD (GENERALIZED ANXIETY DISORDER): ICD-10-CM

## 2025-02-20 RX ORDER — SERTRALINE HYDROCHLORIDE 50 MG/1
50 TABLET, FILM COATED ORAL DAILY
Qty: 30 TABLET | Refills: 1 | Status: SHIPPED | OUTPATIENT
Start: 2025-02-20 | End: 2025-04-21

## 2025-02-20 RX ORDER — TRAZODONE HYDROCHLORIDE 100 MG/1
100 TABLET ORAL NIGHTLY
Qty: 90 TABLET | Refills: 0 | Status: SHIPPED | OUTPATIENT
Start: 2025-02-20 | End: 2025-05-21

## 2025-02-20 NOTE — PROGRESS NOTES
"Outpatient Psychiatry Follow-Up Visit   2/20/2025    Clinical Status of Patient:  Outpatient (Ambulatory)  The patient location is: [place]    Visit type: audiovisual    Face to Face time with patient:   30 minutes of total time spent on the encounter, which includes face to face time and non-face to face time preparing to see the patient (eg, review of tests), Obtaining and/or reviewing separately obtained history, Documenting clinical information in the electronic or other health record, Independently interpreting results (not separately reported) and communicating results to the patient/family/caregiver, or Care coordination (not separately reported).     Each patient to whom he or she provides medical services by telemedicine is:  (1) informed of the relationship between the physician and patient and the respective role of any other health care provider with respect to management of the patient; and (2) notified that he or she may decline to receive medical services by telemedicine and may withdraw from such care at any time.    Chief Complaint:  Skylar Velasquez is a 19 y.o. female who presents today for follow-up of depression and anxiety.  Met with patient.      Interval History and Content of Current Session 02/20/2025:  Pt is A+Ox 4.  Patients mood is "better", affect appears congruent and appropriate. Pts thought process is normal and logical.  Pts speech is normal tone, normal rate, normal pitch, normal volume   Linear and logical, friendly and cooperative, normal eye contact, no psychomotor retardation.  Pt is calmly seated in chair during interview. Pt is casually dressed and well groomed.      Patient states that she's been doing better since our previous appointment. Continues to take Zoloft 25 MG daily and Trazodone 50MG QHS. Patient states that since starting medication regimen she has seen an improvement in in anadonia, apathy, and depressed mood. Pt continues to endorse symptoms of " "anxiety such as irritability and excessive worry. Patient states that she is managing her new position well. Patient intends to move into a new apartment soon, states that she is excited to provide her son with this. Patient continues to endorse issues with initiating and maintaining sleep. Patient amendable to increasing Zoloft and Trazodone.    Pt reports taking medications as prescribed and behaving appropriately during interview today.  Denies SI/HI/AVH. Denies side effects of medications.  Pt reports sleeping better and normal appetite.   Denies recreational drug use.  Pt reports denies drinks per week, denies tobacco use, denies Vaping, denies Caffeine.      Standardized Screenings tools:   PHQ9: 3  DARREN- 7: 7    Prior visit :  Psych Interview 01/23/2025:   Skylar Velasquez is a 19 y.o. female with past psychiatric history of anxiety and depression  presented to for initial evaluation and treatment for mood.     Pt is A+Ox 4.  Patients mood is "I've been better", affect appears guarded, anxious. Pts thought process is normal and logical.  Pts speech is normal tone, normal rate, normal pitch, normal volume   Linear and logical, friendly and cooperative, normal eye contact, no psychomotor retardation.  Pt is calmly seated in chair during interview.  Pt is casually dressed and well groomed.        Patient was never previously being seen by Psychiatry.  Pt states that they have never taken anything for mood.  Patient endorses acute on chronic anxiety and depression which began in middle school and has progressively worsened recently. Patient had her son at 15 years old, states that they have bonded well but she has noticed an increase in irritability over the past one year. Patient has a good support system consisting of her parents whom she lives with. Patient states that her child's father is not in the picture. Patient states that his family has recently become more involved. Patient states that her " biggest stressor currently is juggling work and home life. Patient has three jobs , [employer] coordinator, and real estate stagger. 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 Patient states that she has been having a difficult time initiating sleep, has tried melatonin which gave her nightmares. Was taking unisom while she was pregnant which helped some, interested in trying alternative medications for insomnia.     Pt states that her sister was Dx with bipolar at 16y/o. Pt denies hx symptoms/episodes of sonia.     Denies prior hx of psychiatric hospitalizations. Denies hx of suicide attempts. Pt denies hx self harm. Pt denies hx hallucinations.  Pt endorses binging and purging in middle school.    Pt denies hx trauma. Denies physical/sexual abuse. Pt denies symptoms including nightmares, hypervigilance, flashbacks, avoidance behaviors, and disassociation.     Reports depression today as 4/10, and anxiety as 7/10.  Reports sleeping 5 hrs per night, and poor appetite.   Denies SI/HI/AVH.  Pt states that there support consists of parents whom she lives with.    Access to Gun denies  Denies recreational drug use.  Pt reports denies drinks per week, denies tobacco use, denies Vaping, denies Caffeine.       Current Medication:  none     Past Medications:  none     DX:  The patient complained of depressed mood with lethargy, decreased appetite , insomnia, psycho-motor retardation, anhedonia, apathy, worsening self-esteem, guilt, decreased concentration and ability to make decisions.      Pt denies hx symptoms/episodes of sonia.     Admits to symptoms of anxiety including excessive anxiety/worry/fear, more days than not, about numerous issues, difficulty controlling the worry, over thinking, rumination, restlessness, poor concentration, fatigue, and increased irritability. Denies panic attacks at this time.      Standardized Screenings tools:   PHQ9: 13  DARREN- 7:  13    Past Psychiatric History:   Previous Psychiatric Hospitalizations: NO  Previous Medication Trials: NO  History of psychotherapy:  NO  Previous Suicide Attempts: NO  History of Violence:  NO  History of physical/sexual abuse: NO  Outpatient psychiatric provider(past): NO     Substance Abuse History:   Tobacco: NO  Alcohol: NO  Illicit Substances: NO  Detox/Rehab: NO     Neurological History:   Seizures: NO  Head trauma: NO     Family Psychiatric History: Yes -   Sister - bipolar   Father - anxiety     Social History:  Developmental/Childhood:Achieved all developmental milestones timely  *Education:High School Diploma  Employment Status/Finances:Employed   Relationship Status/Sexual Orientation: Single:    Children: 1  Housing Status: Home    history:  NO  Access to gun: NO  Hinduism:Non-practicing  Recreational activities:Time with family  Person patient is closest to/confides in: parents     Legal History:   Past Charges/Incarcerations:  No      Review of Systems     Review of Systems   Constitutional:  Negative for weight loss.   HENT:  Negative for tinnitus.    Eyes:  Negative for blurred vision.   Respiratory:  Negative for cough and shortness of breath.    Cardiovascular:  Negative for chest pain.   Gastrointestinal:  Negative for abdominal pain.   Genitourinary:  Negative for dysuria.   Musculoskeletal:  Negative for back pain and neck pain.   Skin:  Negative for rash.   Neurological:  Negative for dizziness, seizures and weakness.   Psychiatric/Behavioral:  Negative for depression, hallucinations, memory loss, substance abuse and suicidal ideas. The patient is nervous/anxious and has insomnia.          Past Medical, Family and Social History: The patient's past medical, family and social history have been reviewed and updated as appropriate within the electronic medical record - see encounter notes.      Current Medications:   Medication List with Changes/Refills   Current Medications      "MG TABLET    TAKE 1 TABLET (600MG) BY MOUTH EVERY 6 HOURS WITH FOOD AS NEEDED FOR PAIN    MEDROXYPROGESTERONE (DEPO-PROVERA) 150 MG/ML INJECTION    Inject 150 mg into the muscle every 3 (three) months.   Changed and/or Refilled Medications    Modified Medication Previous Medication    SERTRALINE (ZOLOFT) 50 MG TABLET sertraline (ZOLOFT) 25 MG tablet       Take 1 tablet (50 mg total) by mouth once daily.    Take 1 tablet (25 mg total) by mouth once daily.    TRAZODONE (DESYREL) 100 MG TABLET traZODone (DESYREL) 50 MG tablet       Take 1 tablet (100 mg total) by mouth every evening.    Take 0.5-1 tablets (25-50 mg total) by mouth every evening.         Allergies:   Review of patient's allergies indicates:   Allergen Reactions    Augmentin [amoxicillin-pot clavulanate] Rash         Vitals   There were no vitals filed for this visit.       Labs/Imaging/Studies:   No results found for this or any previous visit (from the past 48 hours).   No results found for: "PHENYTOIN", "PHENOBARB", "VALPROATE", "CBMZ"    Compliance: yes    Side effects: None    Risk Parameters:  Patient reports no suicidal ideation  Patient reports no homicidal ideation  Patient reports no self-injurious behavior  Patient reports no violent behavior    Exam (detailed: at least 9 elements; comprehensive: all 15 elements)   Constitutional  Vitals:  Most recent vital signs, dated less than 90 days prior to this appointment, were reviewed.   There were no vitals filed for this visit.     General:  unremarkable, age appropriate     Musculoskeletal  Muscle Strength/Tone:  no spasicity, no rigidity, no cogwheeling, no flaccidity, no paratonia, no dyskinesia, no dystonia, no tremor, no tic, no choreoathetosis, no atrophy   Gait & Station:  non-ataxic     Psychiatric  Speech:  no latency; no press   Mood & Affect:  anxious  congruent and appropriate   Thought Process:  normal and logical   Associations:  intact   Thought Content:  normal, no suicidality, no " homicidality, delusions, or paranoia   Insight:  intact, has awareness of illness   Judgement: behavior is adequate to circumstances, age appropriate   Orientation:  grossly intact, person, place, situation, time/date   Memory: intact for content of interview, grossly intact, memory >3 objects at five mins   Language: grossly intact, able to name, able to repeat   Attention Span & Concentration:  able to focus, completed tasks   Fund of Knowledge:  intact and appropriate to age and level of education, familiar with aspects of current personal life     Assessment and Diagnosis   Status/Progress: Based on the examination today, the patient's problem(s) is/are resolving.  New problems have not been presented today.      General Impression:      ICD-10-CM ICD-9-CM   1. MDD (major depressive disorder), recurrent episode, moderate  F33.1 296.32   2. DARREN (generalized anxiety disorder)  F41.1 300.02       Intervention/Counseling/Treatment Plan   Mood   Increase Zoloft 50mg daily - targeting anxiety and depression   Increase Trazodone 100mg QHS - targeting insomnia    Pt would benefit from Talk therapy, States that she is not interested at this time     Labs reviewed: Kidney and Liver function look - OK. No concern at this time    Discussed diagnosis, risks and benefits of proposed treatment above vs alternative treatments vs no treatment, and potential side effects of these treatments, and the inherent unpredictability of individual response to treatment.  The patient expresses understanding and gives informed consent to pursue treatment.  The potential benefits outweigh the potential risks. Patient has no other questions. Risks/adverse effects discussed at this time including but not limited to: GI side effects, sexual dysfunction, activation vs sedation, triggering of suicidal thoughts, and serotonin syndrome.   Educated about negative aspects of psychiatric medications during pregnancy and should reach out to me should  she decide to or become pregnant. Pt instructed to take all   precautions to prevent pregnancy while on these medications.    Serotonin syndrome   Mental status changes can include anxiety, restlessness, disorientation, and agitated delirium.    Autonomic manifestations can include diaphoresis, tachycardia, hyperthermia, hypertension, vomiting, and diarrhea   Neuromuscular hyperactivity can manifest as tremor, myoclonus, hyperreflexia, rigidity, hyperthermia, seizure, and bilateral Babinski sign.   Pt was informed that if they experience any of these symptoms to go the ED.     Difficulty Sleeping Behavioral Modification:  Implement stimulus control: [place] bedroom for sleep only. Leave bedroom when frustrated from not sleeping. Engage in relaxation before returning. Engage in activities during the day. AVOID >7-8 h time in bed  Avoid clock watching  Avoid thinking/worrying about sleep when trying to fall asleep  Limit caffeinated consumption  Make sure the bedroom is dark, quiet and cool    Safety Plan   Patient voices understanding and agreement with this plan  Provided crisis numbers  Encouraged patient to keep future appointments.  Instructed patient to call or message with questions or concerns  In the event of an emergency, including suicidal ideation, patient was advised to go to the emergency room and/or call 911    Return to Clinic: 1 month    Psychotherapy:  Target symptoms: depression, anxiety   Why chosen therapy is appropriate versus another modality: relevant to diagnosis, evidence based practice  Outcome monitoring methods: self-report, observation  Therapeutic intervention type: insight oriented psychotherapy, interactive psychotherapy  Topics discussed/themes: relationships difficulties, difficulty managing affect in interpersonal relationships, building skills sets for symptom management  The patient's response to the intervention is guarded. The patient's progress toward treatment goals is fair.    Duration of intervention: 15 minutes.    Total face to face time: 20 min  Total time (chart review, patient contact, documentation): 30 min    A diagnostic psychiatric evaluation was performed and responsiveness to treatment was assessed.  The patient demonstrates adequate ability/capacity to respond to treatment.    Lester Pinedo PA-C    *This note has been prepared using a combination of a dictation device and typing.  It has been checked for errors but some errors may still exist within the note as a result of speech recognition errors and/or typographical errors.

## 2025-03-17 RX ORDER — SERTRALINE HYDROCHLORIDE 50 MG/1
50 TABLET, FILM COATED ORAL
Qty: 90 TABLET | Refills: 0 | Status: SHIPPED | OUTPATIENT
Start: 2025-03-17

## 2025-04-03 ENCOUNTER — OFFICE VISIT (OUTPATIENT)
Dept: PSYCHIATRY | Facility: CLINIC | Age: 20
End: 2025-04-03
Payer: COMMERCIAL

## 2025-04-03 DIAGNOSIS — F41.1 GAD (GENERALIZED ANXIETY DISORDER): ICD-10-CM

## 2025-04-03 DIAGNOSIS — F33.41 MDD (MAJOR DEPRESSIVE DISORDER), RECURRENT, IN PARTIAL REMISSION: Primary | ICD-10-CM

## 2025-04-03 RX ORDER — SERTRALINE HYDROCHLORIDE 50 MG/1
50 TABLET, FILM COATED ORAL DAILY
Qty: 90 TABLET | Refills: 3 | Status: SHIPPED | OUTPATIENT
Start: 2025-04-03 | End: 2026-03-29

## 2025-04-03 RX ORDER — TRAZODONE HYDROCHLORIDE 100 MG/1
100 TABLET ORAL NIGHTLY
Qty: 90 TABLET | Refills: 3 | Status: SHIPPED | OUTPATIENT
Start: 2025-04-03 | End: 2026-03-29

## 2025-04-03 NOTE — PROGRESS NOTES
"Outpatient Psychiatry Follow-Up Visit   4/3/2025    Clinical Status of Patient:  Outpatient (Ambulatory)  The patient location is: [place]    Visit type: audiovisual    Face to Face time with patient:   30 minutes of total time spent on the encounter, which includes face to face time and non-face to face time preparing to see the patient (eg, review of tests), Obtaining and/or reviewing separately obtained history, Documenting clinical information in the electronic or other health record, Independently interpreting results (not separately reported) and communicating results to the patient/family/caregiver, or Care coordination (not separately reported).     Each patient to whom he or she provides medical services by telemedicine is:  (1) informed of the relationship between the physician and patient and the respective role of any other health care provider with respect to management of the patient; and (2) notified that he or she may decline to receive medical services by telemedicine and may withdraw from such care at any time.    Chief Complaint:  Skylar Velasquez is a 19 y.o. female who presents today for follow-up of depression and anxiety.  Met with patient.      Interval History and Content of Current Session 04/03/2025:  Pt is A+Ox 4.  Patients mood is "better", affect appears congruent and appropriate. Pts thought process is normal and logical.  Pts speech is normal tone, normal rate, normal pitch, normal volume   Linear and logical, friendly and cooperative, normal eye contact, no psychomotor retardation.  Pt is calmly seated in chair during interview. Pt is casually dressed and well groomed.      Patient says she has been doing significantly better since our previous appointment. Continues to take Zoloft 50MG daily and Trazodone 100MG QHS PRN insomnia (takes approximately 1-2 times a week). Patient states that since increasing zoloft she has seen a significant improvement in both anxiety and " "depression. Patient states she continues to experience issues with irritability, states that she is working on this personally. Patient recently moved to [facility] with her son. Stated that the move was challenging but rewarding. Patient states that work has been going well. Patient requesting refills.    Reports depression today as 0/10, and anxiety as 2/10.  Pt reports taking medications as prescribed and behaving appropriately during interview today.  Denies SI/HI/AVH. Denies side effects of medications.  Pt reports sleeping better and normal appetite.   Denies recreational drug use.  Pt reports denies drinks per week, denies tobacco use, denies Vaping, denies Caffeine.      Standardized Screenings tools:   PHQ9: 1  DARREN- 7: 1    Interim Events: 2/20/2025  Pt is A+Ox 4.  Patients mood is "better", affect appears congruent and appropriate. Pts thought process is normal and logical.  Pts speech is normal tone, normal rate, normal pitch, normal volume   Linear and logical, friendly and cooperative, normal eye contact, no psychomotor retardation.  Pt is calmly seated in chair during interview. Pt is casually dressed and well groomed.      Patient states that she's been doing better since our previous appointment. Continues to take Zoloft 25 MG daily and Trazodone 50MG QHS. Patient states that since starting medication regimen she has seen an improvement in in anadonia, apathy, and depressed mood. Pt continues to endorse symptoms of anxiety such as irritability and excessive worry. Patient states that she is managing her new position well. Patient intends to move into a new apartment soon, states that she is excited to provide her son with this. Patient continues to endorse issues with initiating and maintaining sleep. Patient amendable to increasing Zoloft and Trazodone.    Pt reports taking medications as prescribed and behaving appropriately during interview today.  Denies SI/HI/AVH. Denies side effects of " "medications.  Pt reports sleeping better and normal appetite.   Denies recreational drug use.  Pt reports denies drinks per week, denies tobacco use, denies Vaping, denies Caffeine.      Standardized Screenings tools:   PHQ9: 3  DARREN- 7: 7    Prior visit :  Psych Interview 01/23/2025:   Skylar Velasquez is a 19 y.o. female with past psychiatric history of anxiety and depression  presented to for initial evaluation and treatment for mood.     Pt is A+Ox 4.  Patients mood is "I've been better", affect appears guarded, anxious. Pts thought process is normal and logical.  Pts speech is normal tone, normal rate, normal pitch, normal volume   Linear and logical, friendly and cooperative, normal eye contact, no psychomotor retardation.  Pt is calmly seated in chair during interview.  Pt is casually dressed and well groomed.        Patient was never previously being seen by Psychiatry.  Pt states that they have never taken anything for mood.  Patient endorses acute on chronic anxiety and depression which began in middle school and has progressively worsened recently. Patient had her son at 15 years old, states that they have bonded well but she has noticed an increase in irritability over the past one year. Patient has a good support system consisting of her parents whom she lives with. Patient states that her child's father is not in the picture. Patient states that his family has recently become more involved. Patient states that her biggest stressor currently is juggling work and home life. Patient has three jobs , [employer] coordinator, and real estate stagger. 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 Patient states that she has been having a difficult time initiating sleep, has tried melatonin which gave her nightmares. Was taking unisom while she was pregnant which helped some, interested in trying alternative medications for insomnia.     Pt " states that her sister was Dx with bipolar at 18y/o. Pt denies hx symptoms/episodes of sonia.     Denies prior hx of psychiatric hospitalizations. Denies hx of suicide attempts. Pt denies hx self harm. Pt denies hx hallucinations.  Pt endorses binging and purging in middle school.    Pt denies hx trauma. Denies physical/sexual abuse. Pt denies symptoms including nightmares, hypervigilance, flashbacks, avoidance behaviors, and disassociation.     Reports depression today as 4/10, and anxiety as 7/10.  Reports sleeping 5 hrs per night, and poor appetite.   Denies SI/HI/AVH.  Pt states that there support consists of parents whom she lives with.    Access to Gun denies  Denies recreational drug use.  Pt reports denies drinks per week, denies tobacco use, denies Vaping, denies Caffeine.       Current Medication:  none     Past Medications:  none     DX:  The patient complained of depressed mood with lethargy, decreased appetite , insomnia, psycho-motor retardation, anhedonia, apathy, worsening self-esteem, guilt, decreased concentration and ability to make decisions.      Pt denies hx symptoms/episodes of sonia.     Admits to symptoms of anxiety including excessive anxiety/worry/fear, more days than not, about numerous issues, difficulty controlling the worry, over thinking, rumination, restlessness, poor concentration, fatigue, and increased irritability. Denies panic attacks at this time.      Standardized Screenings tools:   PHQ9: 13  DARREN- 7: 13    Past Psychiatric History:   Previous Psychiatric Hospitalizations: NO  Previous Medication Trials: NO  History of psychotherapy:  NO  Previous Suicide Attempts: NO  History of Violence:  NO  History of physical/sexual abuse: NO  Outpatient psychiatric provider(past): NO     Substance Abuse History:   Tobacco: NO  Alcohol: NO  Illicit Substances: NO  Detox/Rehab: NO     Neurological History:   Seizures: NO  Head trauma: NO     Family Psychiatric History: Yes -   Sister -  bipolar   Father - anxiety     Social History:  Developmental/Childhood:Achieved all developmental milestones timely  *Education:High School Diploma  Employment Status/Finances:Employed   Relationship Status/Sexual Orientation: Single:    Children: 1  Housing Status: Home    history:  NO  Access to gun: NO  Mandaeism:Non-practicing  Recreational activities:Time with family  Person patient is closest to/confides in: parents     Legal History:   Past Charges/Incarcerations:  No      Review of Systems     Review of Systems   Constitutional:  Negative for weight loss.   HENT:  Negative for tinnitus.    Eyes:  Negative for blurred vision.   Respiratory:  Negative for cough and shortness of breath.    Cardiovascular:  Negative for chest pain.   Gastrointestinal:  Negative for abdominal pain.   Genitourinary:  Negative for dysuria.   Musculoskeletal:  Negative for back pain and neck pain.   Skin:  Negative for rash.   Neurological:  Negative for dizziness, seizures and weakness.   Psychiatric/Behavioral:  Negative for depression, hallucinations, memory loss, substance abuse and suicidal ideas. The patient is nervous/anxious and has insomnia.          Past Medical, Family and Social History: The patient's past medical, family and social history have been reviewed and updated as appropriate within the electronic medical record - see encounter notes.      Current Medications:   Medication List with Changes/Refills   Current Medications     MG TABLET    TAKE 1 TABLET (600MG) BY MOUTH EVERY 6 HOURS WITH FOOD AS NEEDED FOR PAIN    MEDROXYPROGESTERONE (DEPO-PROVERA) 150 MG/ML INJECTION    Inject 150 mg into the muscle every 3 (three) months.   Changed and/or Refilled Medications    Modified Medication Previous Medication    SERTRALINE (ZOLOFT) 50 MG TABLET sertraline (ZOLOFT) 50 MG tablet       Take 1 tablet (50 mg total) by mouth once daily.    TAKE 1 TABLET BY MOUTH EVERY DAY    TRAZODONE (DESYREL) 100 MG TABLET  "traZODone (DESYREL) 100 MG tablet       Take 1 tablet (100 mg total) by mouth every evening.    Take 1 tablet (100 mg total) by mouth every evening.         Allergies:   Review of patient's allergies indicates:   Allergen Reactions    Augmentin [amoxicillin-pot clavulanate] Rash         Vitals   There were no vitals filed for this visit.       Labs/Imaging/Studies:   No results found for this or any previous visit (from the past 48 hours).   No results found for: "PHENYTOIN", "PHENOBARB", "VALPROATE", "CBMZ"    Compliance: yes    Side effects: None    Risk Parameters:  Patient reports no suicidal ideation  Patient reports no homicidal ideation  Patient reports no self-injurious behavior  Patient reports no violent behavior    Exam (detailed: at least 9 elements; comprehensive: all 15 elements)   Constitutional  Vitals:  Most recent vital signs, dated less than 90 days prior to this appointment, were reviewed.   There were no vitals filed for this visit.     General:  unremarkable, age appropriate     Musculoskeletal  Muscle Strength/Tone:  no spasicity, no rigidity, no cogwheeling, no flaccidity, no paratonia, no dyskinesia, no dystonia, no tremor, no tic, no choreoathetosis, no atrophy   Gait & Station:  non-ataxic     Psychiatric  Speech:  no latency; no press   Mood & Affect:  anxious  congruent and appropriate   Thought Process:  normal and logical   Associations:  intact   Thought Content:  normal, no suicidality, no homicidality, delusions, or paranoia   Insight:  intact, has awareness of illness   Judgement: behavior is adequate to circumstances, age appropriate   Orientation:  grossly intact, person, place, situation, time/date   Memory: intact for content of interview, grossly intact, memory >3 objects at five mins   Language: grossly intact, able to name, able to repeat   Attention Span & Concentration:  able to focus, completed tasks   Fund of Knowledge:  intact and appropriate to age and level of " education, familiar with aspects of current personal life     Assessment and Diagnosis   Status/Progress: Based on the examination today, the patient's problem(s) is/are resolving.  New problems have not been presented today.      General Impression:      ICD-10-CM ICD-9-CM   1. MDD (major depressive disorder), recurrent, in partial remission  F33.41 296.35   2. DARREN (generalized anxiety disorder)  F41.1 300.02         Intervention/Counseling/Treatment Plan   Mood   Continue Zoloft 50mg daily - targeting anxiety and depression   Continue Trazodone 100mg QHS - targeting insomnia    Pt would benefit from Talk therapy, States that she is not interested at this time     Labs reviewed: Kidney and Liver function look - OK. No concern at this time    Discussed diagnosis, risks and benefits of proposed treatment above vs alternative treatments vs no treatment, and potential side effects of these treatments, and the inherent unpredictability of individual response to treatment.  The patient expresses understanding and gives informed consent to pursue treatment.  The potential benefits outweigh the potential risks. Patient has no other questions. Risks/adverse effects discussed at this time including but not limited to: GI side effects, sexual dysfunction, activation vs sedation, triggering of suicidal thoughts, and serotonin syndrome.   Educated about negative aspects of psychiatric medications during pregnancy and should reach out to me should she decide to or become pregnant. Pt instructed to take all   precautions to prevent pregnancy while on these medications.    Serotonin syndrome   Mental status changes can include anxiety, restlessness, disorientation, and agitated delirium.    Autonomic manifestations can include diaphoresis, tachycardia, hyperthermia, hypertension, vomiting, and diarrhea   Neuromuscular hyperactivity can manifest as tremor, myoclonus, hyperreflexia, rigidity, hyperthermia, seizure, and bilateral  Babinski sign.   Pt was informed that if they experience any of these symptoms to go the ED.     Difficulty Sleeping Behavioral Modification:  Implement stimulus control: [place] bedroom for sleep only. Leave bedroom when frustrated from not sleeping. Engage in relaxation before returning. Engage in activities during the day. AVOID >7-8 h time in bed  Avoid clock watching  Avoid thinking/worrying about sleep when trying to fall asleep  Limit caffeinated consumption  Make sure the bedroom is dark, quiet and cool    Safety Plan   Patient voices understanding and agreement with this plan  Provided crisis numbers  Encouraged patient to keep future appointments.  Instructed patient to call or message with questions or concerns  In the event of an emergency, including suicidal ideation, patient was advised to go to the emergency room and/or call 911    Return to Clinic: 1 month    Psychotherapy:  Target symptoms: depression, anxiety   Why chosen therapy is appropriate versus another modality: relevant to diagnosis, evidence based practice  Outcome monitoring methods: self-report, observation  Therapeutic intervention type: insight oriented psychotherapy, interactive psychotherapy  Topics discussed/themes: relationships difficulties, difficulty managing affect in interpersonal relationships, building skills sets for symptom management  The patient's response to the intervention is guarded. The patient's progress toward treatment goals is fair.   Duration of intervention: 15 minutes.    Total face to face time: 20 min  Total time (chart review, patient contact, documentation): 30 min    A diagnostic psychiatric evaluation was performed and responsiveness to treatment was assessed.  The patient demonstrates adequate ability/capacity to respond to treatment.    Lester Pinedo PA-C    *This note has been prepared using a combination of a dictation device and typing.  It has been checked for errors but some errors may still  exist within the note as a result of speech recognition errors and/or typographical errors.